# Patient Record
Sex: FEMALE | Race: WHITE
[De-identification: names, ages, dates, MRNs, and addresses within clinical notes are randomized per-mention and may not be internally consistent; named-entity substitution may affect disease eponyms.]

---

## 2019-06-27 PROBLEM — Z00.00 ENCOUNTER FOR PREVENTIVE HEALTH EXAMINATION: Status: ACTIVE | Noted: 2019-06-27

## 2019-07-11 ENCOUNTER — APPOINTMENT (OUTPATIENT)
Dept: ORTHOPEDIC SURGERY | Facility: CLINIC | Age: 69
End: 2019-07-11
Payer: MEDICARE

## 2019-07-11 VITALS — DIASTOLIC BLOOD PRESSURE: 99 MMHG | SYSTOLIC BLOOD PRESSURE: 172 MMHG | HEART RATE: 84 BPM

## 2019-07-11 DIAGNOSIS — M17.12 UNILATERAL PRIMARY OSTEOARTHRITIS, LEFT KNEE: ICD-10-CM

## 2019-07-11 DIAGNOSIS — M70.62 TROCHANTERIC BURSITIS, LEFT HIP: ICD-10-CM

## 2019-07-11 PROCEDURE — 99203 OFFICE O/P NEW LOW 30 MIN: CPT | Mod: 25

## 2019-07-11 PROCEDURE — 73564 X-RAY EXAM KNEE 4 OR MORE: CPT | Mod: LT

## 2019-07-11 PROCEDURE — 20610 DRAIN/INJ JOINT/BURSA W/O US: CPT | Mod: LT

## 2019-07-11 PROCEDURE — 73501 X-RAY EXAM HIP UNI 1 VIEW: CPT | Mod: LT

## 2019-07-12 PROBLEM — M17.12 PRIMARY OSTEOARTHRITIS OF LEFT KNEE: Status: ACTIVE | Noted: 2019-07-12

## 2019-07-12 PROBLEM — M70.62 GREATER TROCHANTERIC BURSITIS OF LEFT HIP: Status: ACTIVE | Noted: 2019-07-12

## 2019-07-12 NOTE — DISCUSSION/SUMMARY
[de-identified] : 68-year-old female with left hip trochanteric bursitis/knee osteoarthritis\par \par The patient's presentation is consistent with peritrochanteric pain syndrome. Discussed with the patient the likely causes of the discomfort including greater trochanteric bursitis, iliotibial band irritation, hip abduction dysfunction/gluteal tendinopathy. There is no significant evidence of sciatic nerve irritation/radicular pain.\par \par Treatment for this syndrome typically includes conservative/nonoperative management including hip/low strengthening and stretching, NSAIDs, and physical therapy modalities with possible use of corticosteroid injection on an as-needed basis. The patient fails prolonged conservative management, operative intervention may be warranted.\par \par Recommendation: Conservative management as outlined above. Injection therapy perform today for symptomatic relief. Physical therapy prescription provided.\par \par Follow up 3mos as needed.

## 2019-07-12 NOTE — PROCEDURE
[de-identified] : Injection: Left hip trochanteric bursa.\par Indication: Trochanteric bursitis.\par \par A discussion was had with the patient regarding this procedure and all questions were answered. All risks, benefits and alternatives were discussed. These included but were not limited to bleeding, infection, and allergic reaction. The patient lay in the right lateral decubitus position and the point of maximal tenderness over the right greater trochanter was localized. Alcohol was then used to clean the skin, and betadine was used to sterilize and prep the area in this location on lateral aspect of the left hip. Ethyl chloride spray was then used as a topical anesthetic. A 21-gauge needle was used to inject 4cc of 1% lidocaine and 1cc of 40mg/ml methylprednisolone into the left trochanteric bursa. A sterile bandage was then applied. The patient tolerated the procedure well and there were no complications.

## 2019-07-12 NOTE — PHYSICAL EXAM
[de-identified] : \par The following radiographs were ordered and read by me during this patients visit. I reviewed each radiograph in detail with the patient and discussed the findings as highlighted below. \par \par AP pelvis and lateral view of the left hip were obtained today that show no acute bony injury, including fracture or dislocation. There is mild hip degenerative change seen. There is no gross pelvic of hip malalignment. Calcification to greater trochanter.\par \par 4 images left knee also obtained show mild tricompartmental arthrosis. [de-identified] : Oriented to time, place, person\par Mood: Normal\par Affect: Normal\par Appearance: Healthy, well appearing, no acute distress.\par Gait: Antalgic\par Assistive Devices: None\par \par Left hip exam\par \par Skin: Clean/dry and intact\par Inspection: No obvious deformity, no swelling.\par Pulses: 2+ DP/PT pulses\par ROM: 0-95 flexion. Internal rotation to 20. External rotation to 40 with pain.\par Painful ROM: Lateral hip pain with motion, No groin pain. \par Tenderness: Positive tenderness over greater trochanter. No t positive enderness at gluteal insertion. No paraspinal tenderness. No axial lumbar tenderness.No sacroiliac tenderness. No ttp over the ASIS/Illiac crest. Tender over the iliotibial band\par Strength: 4/5 hip flexion, 4/5 Gluteal strength, 4/5 hip ADD, 4/5 hip ABD, 4/5 Q/H, 5/5 TA/GS/EHL\par Neuro: Sensation in tact to light touch throughout, DTR normal\par Additional tests: Negative impingement test, Negative TISHA\par \par

## 2019-07-12 NOTE — HISTORY OF PRESENT ILLNESS
[de-identified] : 68 year old female presents today with left hip pain x 4 years. No injury reported. The pain is intermittent brought on at night when laying down. Pain radiates from buttock to the lateral aspect of the thigh and left knee. Denies groin, numbness or tingling. Patient's daughter is translating during visit. She also reports bilateral knee pains. However, the majority of symptoms appear to be related to lateral hip pain.\par \par The patient's past medical history, past surgical history, medications and allergies were reviewed by me today with the patient and documented accordingly. In addition, the patient's family and social history, which were noncontributory to this visit, were reviewed also.

## 2025-06-06 ENCOUNTER — OFFICE (OUTPATIENT)
Facility: LOCATION | Age: 75
Setting detail: OPHTHALMOLOGY
End: 2025-06-06
Payer: MEDICARE

## 2025-06-06 ENCOUNTER — RX ONLY (RX ONLY)
Age: 75
End: 2025-06-06

## 2025-06-06 DIAGNOSIS — H25.13: ICD-10-CM

## 2025-06-06 DIAGNOSIS — H25.11: ICD-10-CM

## 2025-06-06 PROCEDURE — 92136 OPHTHALMIC BIOMETRY: CPT | Mod: TC | Performed by: OPHTHALMOLOGY

## 2025-06-06 PROCEDURE — 99204 OFFICE O/P NEW MOD 45 MIN: CPT | Performed by: OPHTHALMOLOGY

## 2025-06-06 PROCEDURE — 92136 OPHTHALMIC BIOMETRY: CPT | Performed by: OPHTHALMOLOGY

## 2025-06-06 ASSESSMENT — REFRACTION_AUTOREFRACTION
OS_SPHERE: 0.00
OD_AXIS: 129
OS_AXIS: 156
OS_CYLINDER: -0.50
OD_SPHERE: +2.50
OD_CYLINDER: -0.25

## 2025-06-06 ASSESSMENT — REFRACTION_CURRENTRX
OS_CYLINDER: 0.00
OS_OVR_VA: 20/
OD_CYLINDER: 0.00
OD_OVR_VA: 20/
OD_SPHERE: +2.50
OD_AXIS: 0
OS_AXIS: 0
OS_SPHERE: +2.25

## 2025-06-06 ASSESSMENT — KERATOMETRY
OD_K2POWER_DIOPTERS: 45.75
OD_AXISANGLE2_DEGREES: 066
OD_K1POWER_DIOPTERS: 45.25
OS_K1K2_AVERAGE: 45.5
OD_CYLPOWER_DEGREES: 0.5
OS_K1POWER_DIOPTERS: 45.50
OD_K2POWER_DIOPTERS: 45.75
OD_AXISANGLE_DEGREES: 066
OS_K2POWER_DIOPTERS: 45.50
OS_AXISANGLE2_DEGREES: 094
OD_AXISANGLE_DEGREES: 156
OS_AXISANGLE_DEGREES: 094
OS_AXISANGLE_DEGREES: 4
OS_CYLAXISANGLE_DEGREES: 4
OS_K1POWER_DIOPTERS: 45.50
OS_K2POWER_DIOPTERS: 45.50
OD_K1POWER_DIOPTERS: 45.25
OD_K1K2_AVERAGE: 45.5
OD_CYLAXISANGLE_DEGREES: 066

## 2025-06-06 ASSESSMENT — CONFRONTATIONAL VISUAL FIELD TEST (CVF)
OD_FINDINGS: FULL
OS_FINDINGS: FULL

## 2025-06-06 ASSESSMENT — VISUAL ACUITY
OD_BCVA: 20/60+2
OS_BCVA: 20/30+2

## 2025-06-17 ENCOUNTER — ASC (OUTPATIENT)
Dept: URBAN - METROPOLITAN AREA SURGERY 8 | Facility: SURGERY | Age: 75
Setting detail: OPHTHALMOLOGY
End: 2025-06-17
Payer: MEDICARE

## 2025-06-17 DIAGNOSIS — H52.221: ICD-10-CM

## 2025-06-17 DIAGNOSIS — H25.11: ICD-10-CM

## 2025-06-17 PROCEDURE — FEMTO PRECISION LASER CATARACT SURGERY: Mod: GY,RT | Performed by: OPHTHALMOLOGY

## 2025-06-17 PROCEDURE — 66984 XCAPSL CTRC RMVL W/O ECP: CPT | Mod: RT | Performed by: OPHTHALMOLOGY

## 2025-06-18 ENCOUNTER — RX ONLY (RX ONLY)
Age: 75
End: 2025-06-18

## 2025-06-18 ENCOUNTER — OFFICE (OUTPATIENT)
Facility: LOCATION | Age: 75
Setting detail: OPHTHALMOLOGY
End: 2025-06-18
Payer: MEDICARE

## 2025-06-18 DIAGNOSIS — Z96.1: ICD-10-CM

## 2025-06-18 DIAGNOSIS — H25.12: ICD-10-CM

## 2025-06-18 PROCEDURE — 92136 OPHTHALMIC BIOMETRY: CPT | Mod: 26,LT | Performed by: OPHTHALMOLOGY

## 2025-06-18 PROCEDURE — 99024 POSTOP FOLLOW-UP VISIT: CPT | Performed by: OPHTHALMOLOGY

## 2025-06-18 ASSESSMENT — CONFRONTATIONAL VISUAL FIELD TEST (CVF)
OD_FINDINGS: FULL
OS_FINDINGS: FULL

## 2025-06-18 ASSESSMENT — CORNEAL EDEMA CLINICAL DESCRIPTION: OD_CORNEALEDEMA: T

## 2025-06-24 ASSESSMENT — REFRACTION_CURRENTRX
OS_OVR_VA: 20/
OS_SPHERE: +2.25
OD_SPHERE: +2.50
OS_CYLINDER: 0.00
OD_AXIS: 0
OD_OVR_VA: 20/
OS_AXIS: 0
OD_CYLINDER: 0.00

## 2025-06-24 ASSESSMENT — REFRACTION_AUTOREFRACTION
OD_AXIS: 160
OD_CYLINDER: -0.25
OS_SPHERE: +2.00
OS_AXIS: 045
OD_SPHERE: +0.75
OS_CYLINDER: -0.50

## 2025-06-24 ASSESSMENT — KERATOMETRY
OS_K1POWER_DIOPTERS: 45.25
OS_K2POWER_DIOPTERS: 46.25
OD_K1POWER_DIOPTERS: 44.50
OD_K2POWER_DIOPTERS: 45.25
OD_AXISANGLE_DEGREES: 080
OS_AXISANGLE_DEGREES: 092

## 2025-06-24 ASSESSMENT — VISUAL ACUITY
OD_BCVA: 20/40
OS_BCVA: 20/25-2

## 2025-07-01 ENCOUNTER — ASC (OUTPATIENT)
Dept: URBAN - METROPOLITAN AREA SURGERY 8 | Facility: SURGERY | Age: 75
Setting detail: OPHTHALMOLOGY
End: 2025-07-01
Payer: MEDICARE

## 2025-07-01 DIAGNOSIS — H25.12: ICD-10-CM

## 2025-07-01 DIAGNOSIS — H52.222: ICD-10-CM

## 2025-07-01 PROCEDURE — FEMTO PRECISION LASER CATARACT SURGERY: Mod: GY | Performed by: OPHTHALMOLOGY

## 2025-07-01 PROCEDURE — 66984 XCAPSL CTRC RMVL W/O ECP: CPT | Mod: 79,LT | Performed by: OPHTHALMOLOGY

## 2025-07-02 ENCOUNTER — RX ONLY (RX ONLY)
Age: 75
End: 2025-07-02

## 2025-07-02 ENCOUNTER — OFFICE (OUTPATIENT)
Facility: LOCATION | Age: 75
Setting detail: OPHTHALMOLOGY
End: 2025-07-02
Payer: MEDICARE

## 2025-07-02 DIAGNOSIS — Z96.1: ICD-10-CM

## 2025-07-02 PROCEDURE — 99024 POSTOP FOLLOW-UP VISIT: CPT | Performed by: OPHTHALMOLOGY

## 2025-07-02 ASSESSMENT — REFRACTION_CURRENTRX
OS_SPHERE: +2.25
OS_CYLINDER: 0.00
OD_SPHERE: +2.50
OS_AXIS: 0
OS_OVR_VA: 20/
OD_AXIS: 0
OD_CYLINDER: 0.00
OD_OVR_VA: 20/

## 2025-07-02 ASSESSMENT — KERATOMETRY
OD_K2POWER_DIOPTERS: 45.75
OS_AXISANGLE_DEGREES: 119
OD_AXISANGLE_DEGREES: 083
OS_K1POWER_DIOPTERS: 45.50
OD_K1POWER_DIOPTERS: 45.00
OS_K2POWER_DIOPTERS: 46.00

## 2025-07-02 ASSESSMENT — REFRACTION_AUTOREFRACTION
OS_AXIS: 057
OD_CYLINDER: -0.50
OD_SPHERE: +0.25
OS_CYLINDER: -0.75
OD_AXIS: 166
OS_SPHERE: +0.25

## 2025-07-02 ASSESSMENT — CORNEAL EDEMA CLINICAL DESCRIPTION: OD_CORNEALEDEMA: ABSENT

## 2025-07-02 ASSESSMENT — CONFRONTATIONAL VISUAL FIELD TEST (CVF)
OS_FINDINGS: FULL
OD_FINDINGS: FULL

## 2025-07-02 ASSESSMENT — VISUAL ACUITY
OD_BCVA: 20/30+2
OS_BCVA: 20/20-2

## 2025-07-29 ENCOUNTER — OFFICE (OUTPATIENT)
Facility: LOCATION | Age: 75
Setting detail: OPHTHALMOLOGY
End: 2025-07-29
Payer: MEDICARE

## 2025-07-29 DIAGNOSIS — Z96.1: ICD-10-CM

## 2025-07-29 DIAGNOSIS — H52.4: ICD-10-CM

## 2025-07-29 PROCEDURE — 99024 POSTOP FOLLOW-UP VISIT: CPT | Performed by: OPHTHALMOLOGY

## 2025-07-29 ASSESSMENT — CONFRONTATIONAL VISUAL FIELD TEST (CVF)
OD_FINDINGS: FULL
OS_FINDINGS: FULL

## 2025-07-29 ASSESSMENT — REFRACTION_CURRENTRX
OS_CYLINDER: 0.00
OD_AXIS: 0
OD_SPHERE: +2.50
OS_SPHERE: +2.25
OD_OVR_VA: 20/
OS_AXIS: 0
OD_CYLINDER: 0.00
OS_OVR_VA: 20/

## 2025-07-29 ASSESSMENT — REFRACTION_AUTOREFRACTION
OD_AXIS: 152
OS_AXIS: 042
OS_CYLINDER: -0.75
OD_SPHERE: +0.25
OD_CYLINDER: -0.50
OS_SPHERE: 0.00

## 2025-07-29 ASSESSMENT — KERATOMETRY
OD_K2POWER_DIOPTERS: 46.00
OD_AXISANGLE_DEGREES: 072
OD_K1POWER_DIOPTERS: 45.00
OS_K2POWER_DIOPTERS: 46.25
OS_AXISANGLE_DEGREES: 112
OS_K1POWER_DIOPTERS: 45.50

## 2025-07-29 ASSESSMENT — VISUAL ACUITY
OD_BCVA: 20/20-2
OS_BCVA: 20/20-1